# Patient Record
Sex: FEMALE | Race: BLACK OR AFRICAN AMERICAN | URBAN - METROPOLITAN AREA
[De-identification: names, ages, dates, MRNs, and addresses within clinical notes are randomized per-mention and may not be internally consistent; named-entity substitution may affect disease eponyms.]

---

## 2018-04-18 ENCOUNTER — CONSULT (OUTPATIENT)
Dept: URBAN - METROPOLITAN AREA CLINIC 26 | Facility: CLINIC | Age: 63
End: 2018-04-18
Payer: MEDICARE

## 2018-04-18 DIAGNOSIS — H40.013 OPEN ANGLE WITH BORDERLINE FINDINGS, LOW RISK, BILATERAL: Primary | ICD-10-CM

## 2018-04-18 PROCEDURE — 92133 CPTRZD OPH DX IMG PST SGM ON: CPT | Performed by: OPHTHALMOLOGY

## 2018-04-18 PROCEDURE — 92250 FUNDUS PHOTOGRAPHY W/I&R: CPT | Performed by: OPHTHALMOLOGY

## 2018-04-18 PROCEDURE — 76514 ECHO EXAM OF EYE THICKNESS: CPT | Performed by: OPHTHALMOLOGY

## 2018-04-18 PROCEDURE — 92083 EXTENDED VISUAL FIELD XM: CPT | Performed by: OPHTHALMOLOGY

## 2018-04-18 PROCEDURE — 92020 GONIOSCOPY: CPT | Performed by: OPHTHALMOLOGY

## 2018-04-18 PROCEDURE — 92004 COMPRE OPH EXAM NEW PT 1/>: CPT | Performed by: OPHTHALMOLOGY

## 2018-04-18 ASSESSMENT — INTRAOCULAR PRESSURE
OS: 16
OD: 21

## 2021-12-01 ENCOUNTER — OFFICE VISIT (OUTPATIENT)
Dept: URBAN - METROPOLITAN AREA CLINIC 21 | Facility: CLINIC | Age: 66
End: 2021-12-01
Payer: MEDICARE

## 2021-12-01 DIAGNOSIS — H25.12 AGE-RELATED NUCLEAR CATARACT, LEFT EYE: ICD-10-CM

## 2021-12-01 DIAGNOSIS — H04.123 TEAR FILM INSUFFICIENCY OF BILATERAL LACRIMAL GLANDS: ICD-10-CM

## 2021-12-01 PROCEDURE — 99213 OFFICE O/P EST LOW 20 MIN: CPT | Performed by: OPTOMETRIST

## 2021-12-01 PROCEDURE — 99203 OFFICE O/P NEW LOW 30 MIN: CPT | Performed by: OPTOMETRIST

## 2021-12-01 ASSESSMENT — INTRAOCULAR PRESSURE
OD: 13
OD: 14
OS: 12
OS: 14

## 2021-12-01 NOTE — IMPRESSION/PLAN
Impression: Open angle with borderline findings, low risk, bilateral: H40.013. Plan: Recommend continue drops as prescribed follow up in 4 months f/u.

## 2021-12-01 NOTE — IMPRESSION/PLAN
Impression: Age-related nuclear cataract, left eye: H25.12. Plan: Advised patient of condition. Mild, no surgical intervention required.

## 2022-01-18 ENCOUNTER — OFFICE VISIT (OUTPATIENT)
Dept: URBAN - METROPOLITAN AREA CLINIC 24 | Facility: CLINIC | Age: 67
End: 2022-01-18
Payer: MEDICARE

## 2022-01-18 PROCEDURE — 92083 EXTENDED VISUAL FIELD XM: CPT | Performed by: OPHTHALMOLOGY

## 2022-01-18 PROCEDURE — 99204 OFFICE O/P NEW MOD 45 MIN: CPT | Performed by: OPHTHALMOLOGY

## 2022-01-18 PROCEDURE — 92020 GONIOSCOPY: CPT | Performed by: OPHTHALMOLOGY

## 2022-01-18 ASSESSMENT — INTRAOCULAR PRESSURE
OS: 14
OD: 15

## 2022-01-18 NOTE — IMPRESSION/PLAN
Impression: Open angle with borderline findings, low risk, bilateral Plan: PT HAS POAG OU           GONIO GRADE:  OD=SS OS=SLIT SS (1/18/22)                PACHS :[phone] PT IS UNDER THE CARE OF DR Meir Park, WAS LAST SEEN AT [facility] ON 4/18/18 TODAY IS 1/18/22 PT REPORTS POSITIVE  FAM HX OF GLAUCOMA (MOTHER WENT BLIND , SISTER AND 2 BROTHERS) PT DENIES SULFA ALLERGY
PT DENIES LUNG DZ
TARGET IOP LOW TEENS OR LESS
RECOMMEND 1. CONTINUE LATANOPROST OU QPM ( PRESENTED USING ) 2. CONTINUE DORZOLAMIDE OD BID  (I SUGGEST ADDING TO OS BID ON 1/18/22) 3. PT REPORTS HAVING USED COMBIGAN IN THE PAST AND WAS TAKEN OFF FOR REASONS WHICH PT IS UNAWARE OF
4. I CONTINUE TO RECOMMEND PT RETURN TO CARE OF DR Meir Park TO EVALUATE THE TREATMENT OPTIONS WHICH HE FEELS IS/ARE MOST PRUDENT. I AGREE WITH THE PATIENT THAT IOP REDUCTION TO THE MID TO LOW TEENS OR LESS IS IDEAL 5.  THE PT WILL SEE DR SURESH AS WELL TO HELP IDENTIFY THE CONTRIBUTING FACTORS TO HER BLURRY VISION

## 2022-04-12 ENCOUNTER — OFFICE VISIT (OUTPATIENT)
Dept: URBAN - METROPOLITAN AREA CLINIC 24 | Facility: CLINIC | Age: 67
End: 2022-04-12
Payer: MEDICARE

## 2022-04-12 DIAGNOSIS — H40.1134 PRIMARY OPEN-ANGLE GLAUCOMA, INDETERMINATE, BILATERAL: ICD-10-CM

## 2022-04-12 DIAGNOSIS — H35.341 MACULAR CYST, HOLE, OR PSEUDOHOLE, RIGHT EYE: ICD-10-CM

## 2022-04-12 DIAGNOSIS — H25.812 COMBINED FORMS OF AGE-RELATED CATARACT, LEFT EYE: Primary | ICD-10-CM

## 2022-04-12 DIAGNOSIS — Z96.1 PRESENCE OF INTRAOCULAR LENS: ICD-10-CM

## 2022-04-12 PROCEDURE — 99214 OFFICE O/P EST MOD 30 MIN: CPT | Performed by: OPTOMETRIST

## 2022-04-12 PROCEDURE — 92134 CPTRZ OPH DX IMG PST SGM RTA: CPT | Performed by: OPTOMETRIST

## 2022-04-12 RX ORDER — BIMATOPROST 0.1 MG/ML
0.01 % SOLUTION/ DROPS OPHTHALMIC
Qty: 7.5 | Refills: 3 | Status: ACTIVE
Start: 2022-04-12

## 2022-04-12 RX ORDER — DORZOLAMIDE HYDROCHLORIDE AND TIMOLOL MALEATE 20; 5 MG/ML; MG/ML
SOLUTION/ DROPS OPHTHALMIC
Qty: 15 | Refills: 3 | Status: ACTIVE
Start: 2022-04-12

## 2022-04-12 RX ORDER — LATANOPROST 50 UG/ML
0.005 % SOLUTION OPHTHALMIC
Qty: 7.5 | Refills: 3 | Status: ACTIVE
Start: 2022-04-12

## 2022-04-12 ASSESSMENT — VISUAL ACUITY
OD: 20/25
OS: 20/20

## 2022-04-12 ASSESSMENT — INTRAOCULAR PRESSURE
OD: 18
OS: 13

## 2022-04-12 ASSESSMENT — KERATOMETRY
OS: 42.30
OD: 42.88

## 2022-04-12 NOTE — IMPRESSION/PLAN
Impression: Macular cyst, hole, or pseudohole, right eye: H35.341.
- s/p repair Mar '11, Dr. Richard Villarreal Plan: Good surgical repair; however limits. Pt aware. NO further action indicated.

## 2022-04-12 NOTE — IMPRESSION/PLAN
Impression: Primary open-angle glaucoma, indeterminate, bilateral: H40.1134.  Plan: Continue gtts / strict ongoing care w/ Dr. Jt Hernandez as directed

## 2022-04-12 NOTE — IMPRESSION/PLAN
Impression: Combined forms of age-related cataract, left eye: H25.812. Plan: Discussed cataracts with patient. Discussed treatment options. Surgical treatment is recommended. Surgical risks/ benefits discussed. Patient will consider surgery. Recommend proceed w/ ready.

## 2022-07-05 ENCOUNTER — OFFICE VISIT (OUTPATIENT)
Dept: URBAN - METROPOLITAN AREA CLINIC 24 | Facility: CLINIC | Age: 67
End: 2022-07-05
Payer: MEDICARE

## 2022-07-05 DIAGNOSIS — H40.013 OPEN ANGLE WITH BORDERLINE FINDINGS, LOW RISK, BILATERAL: Primary | ICD-10-CM

## 2022-07-05 PROCEDURE — 99213 OFFICE O/P EST LOW 20 MIN: CPT | Performed by: OPHTHALMOLOGY

## 2022-07-05 ASSESSMENT — INTRAOCULAR PRESSURE
OD: 14
OS: 14

## 2022-07-05 NOTE — IMPRESSION/PLAN
Impression: Open angle with borderline findings, low risk, bilateral Plan: PT HAS POAG OU           GONIO GRADE:  OD=SS OS=SLIT SS (1/18/22)                PACHS :[phone] PT IS UNDER THE CARE OF DR Feliciano Riddle, WAS LAST SEEN AT [facility] ON 4/18/18 TODAY IS 1/18/22 PT REPORTS POSITIVE  FAM HX OF GLAUCOMA (MOTHER WENT BLIND , SISTER AND 2 BROTHERS) PT DENIES SULFA ALLERGY
PT DENIES LUNG DZ
TARGET IOP LOW TEENS OR LESS
RECOMMEND 1. CONTINUE LATANOPROST OU QPM ( PRESENTED USING ) 2. CONTINUE DORZOLAMIDE OU BID  (I SUGGESTED ADDING TO OS BID ON 1/18/22) 3. PT REPORTS HAVING USED COMBIGAN IN THE PAST AND WAS TAKEN OFF FOR REASONS WHICH PT IS UNAWARE OF
4.  F/U 6 MONTHS WITH VF

## 2022-10-14 ENCOUNTER — OFFICE VISIT (OUTPATIENT)
Dept: URBAN - METROPOLITAN AREA CLINIC 21 | Facility: CLINIC | Age: 67
End: 2022-10-14
Payer: MEDICARE

## 2022-10-14 DIAGNOSIS — H25.12 AGE-RELATED NUCLEAR CATARACT, LEFT EYE: ICD-10-CM

## 2022-10-14 DIAGNOSIS — H40.1132 PRIMARY OPEN-ANGLE GLAUCOMA, MODERATE STAGE, BILATERAL: Primary | ICD-10-CM

## 2022-10-14 DIAGNOSIS — Z96.1 PRESENCE OF INTRAOCULAR LENS: ICD-10-CM

## 2022-10-14 PROCEDURE — 99213 OFFICE O/P EST LOW 20 MIN: CPT | Performed by: OPTOMETRIST

## 2022-10-14 PROCEDURE — 92133 CPTRZD OPH DX IMG PST SGM ON: CPT | Performed by: OPTOMETRIST

## 2022-10-14 RX ORDER — LATANOPROST 50 UG/ML
0.005 % SOLUTION OPHTHALMIC
Qty: 7.5 | Refills: 3 | Status: ACTIVE
Start: 2022-10-14

## 2022-10-14 RX ORDER — DORZOLAMIDE HYDROCHLORIDE AND TIMOLOL MALEATE 20; 5 MG/ML; MG/ML
SOLUTION/ DROPS OPHTHALMIC
Qty: 15 | Refills: 3 | Status: ACTIVE
Start: 2022-10-14

## 2022-10-14 ASSESSMENT — INTRAOCULAR PRESSURE
OD: 15
OS: 14

## 2022-10-14 NOTE — IMPRESSION/PLAN
Impression: Primary open-angle glaucoma, moderate stage, bilateral: H40.4442. Plan: Discussed stable glaucoma findings of today's exam with patient, including diagnosis in detail. Patient will return for testing to measure changes in Glaucoma. Recommend VF, OCT, next available. Patient aware this appointment will take longer than the average appointment. Call with questions, changes in vision or new symptoms. 
RTC: 4 months with Dr Randell Pickett

## 2023-02-23 ENCOUNTER — OFFICE VISIT (OUTPATIENT)
Dept: URBAN - METROPOLITAN AREA CLINIC 21 | Facility: CLINIC | Age: 68
End: 2023-02-23
Payer: MEDICARE

## 2023-02-23 DIAGNOSIS — H40.1132 PRIMARY OPEN-ANGLE GLAUCOMA, MODERATE STAGE, BILATERAL: Primary | ICD-10-CM

## 2023-02-23 DIAGNOSIS — H35.341 MACULAR CYST, HOLE, OR PSEUDOHOLE, RIGHT EYE: ICD-10-CM

## 2023-02-23 DIAGNOSIS — H25.12 AGE-RELATED NUCLEAR CATARACT, LEFT EYE: ICD-10-CM

## 2023-02-23 PROCEDURE — 99213 OFFICE O/P EST LOW 20 MIN: CPT | Performed by: OPHTHALMOLOGY

## 2023-02-23 RX ORDER — LATANOPROST 50 UG/ML
0.005 % SOLUTION OPHTHALMIC
Qty: 7.5 | Refills: 3 | Status: ACTIVE
Start: 2023-02-23

## 2023-02-23 RX ORDER — DORZOLAMIDE HYDROCHLORIDE AND TIMOLOL MALEATE 20; 5 MG/ML; MG/ML
SOLUTION/ DROPS OPHTHALMIC
Qty: 15 | Refills: 3 | Status: ACTIVE
Start: 2023-02-23

## 2023-02-23 ASSESSMENT — INTRAOCULAR PRESSURE
OS: 15
OD: 14

## 2023-02-23 NOTE — IMPRESSION/PLAN
Impression: Macular cyst, hole, or pseudohole, right eye: H35.341. Hx of , 2011 Plan: Advised patient of condition. Stable. Continue to monitor annually.

## 2023-02-23 NOTE — IMPRESSION/PLAN
Impression: Primary open-angle glaucoma, moderate stage, bilateral: H40.1132. Plan: Recommend continue drops as prescribed, Cosopt and Latanoprost, OU. Pt to scheduled VF next available  and follow up in 6 months.

## 2023-02-23 NOTE — IMPRESSION/PLAN
Impression: Age-related nuclear cataract, left eye: H25.12. Plan: Discussed cataract diagnosis with the patient. No treatment required at this time. Patient to monitor visual changes.

## 2023-09-19 ENCOUNTER — OFFICE VISIT (OUTPATIENT)
Dept: URBAN - METROPOLITAN AREA CLINIC 21 | Facility: CLINIC | Age: 68
End: 2023-09-19
Payer: MEDICARE

## 2023-09-19 DIAGNOSIS — H35.371 PUCKERING OF MACULA, RIGHT EYE: ICD-10-CM

## 2023-09-19 DIAGNOSIS — H25.12 AGE-RELATED NUCLEAR CATARACT, LEFT EYE: ICD-10-CM

## 2023-09-19 DIAGNOSIS — H40.1132 PRIMARY OPEN-ANGLE GLAUCOMA, MODERATE STAGE, BILATERAL: Primary | ICD-10-CM

## 2023-09-19 DIAGNOSIS — H35.341 MACULAR CYST, HOLE, OR PSEUDOHOLE, RIGHT EYE: ICD-10-CM

## 2023-09-19 DIAGNOSIS — Z96.1 PRESENCE OF INTRAOCULAR LENS: ICD-10-CM

## 2023-09-19 PROCEDURE — 99214 OFFICE O/P EST MOD 30 MIN: CPT

## 2023-09-19 RX ORDER — DORZOLAMIDE HYDROCHLORIDE AND TIMOLOL MALEATE 20; 5 MG/ML; MG/ML
SOLUTION/ DROPS OPHTHALMIC
Qty: 15 | Refills: 3 | Status: ACTIVE
Start: 2023-09-19

## 2023-09-19 RX ORDER — LATANOPROST 50 UG/ML
0.005 % SOLUTION OPHTHALMIC
Qty: 7.5 | Refills: 3 | Status: ACTIVE
Start: 2023-09-19

## 2023-09-19 ASSESSMENT — INTRAOCULAR PRESSURE
OD: 15
OS: 15

## 2024-01-22 ENCOUNTER — TECH ONLY (OUTPATIENT)
Dept: URBAN - METROPOLITAN AREA CLINIC 21 | Facility: CLINIC | Age: 69
End: 2024-01-22
Payer: MEDICARE

## 2024-01-22 DIAGNOSIS — H40.1132 PRIMARY OPEN-ANGLE GLAUCOMA, BILATERAL, MODERATE STAGE: Primary | ICD-10-CM

## 2024-02-01 ENCOUNTER — OFFICE VISIT (OUTPATIENT)
Dept: URBAN - METROPOLITAN AREA CLINIC 21 | Facility: CLINIC | Age: 69
End: 2024-02-01
Payer: MEDICARE

## 2024-02-01 PROCEDURE — 99212 OFFICE O/P EST SF 10 MIN: CPT

## 2024-02-01 ASSESSMENT — INTRAOCULAR PRESSURE
OS: 15
OD: 15

## 2024-06-05 ENCOUNTER — OFFICE VISIT (OUTPATIENT)
Dept: URBAN - METROPOLITAN AREA CLINIC 21 | Facility: CLINIC | Age: 69
End: 2024-06-05
Payer: MEDICARE

## 2024-06-05 DIAGNOSIS — H40.1132 PRIMARY OPEN-ANGLE GLAUCOMA, MODERATE STAGE, BILATERAL: Primary | ICD-10-CM

## 2024-06-05 PROCEDURE — 99213 OFFICE O/P EST LOW 20 MIN: CPT

## 2024-06-05 RX ORDER — DORZOLAMIDE HYDROCHLORIDE AND TIMOLOL MALEATE 20; 5 MG/ML; MG/ML
SOLUTION/ DROPS OPHTHALMIC
Qty: 15 | Refills: 3 | Status: ACTIVE
Start: 2024-06-05

## 2024-06-05 RX ORDER — LATANOPROST 50 UG/ML
0.005 % SOLUTION OPHTHALMIC
Qty: 7.5 | Refills: 3 | Status: ACTIVE
Start: 2024-06-05

## 2024-06-05 ASSESSMENT — INTRAOCULAR PRESSURE
OS: 14
OD: 16
OD: 17
OS: 17

## 2024-07-11 ENCOUNTER — OFFICE VISIT (OUTPATIENT)
Dept: URBAN - METROPOLITAN AREA CLINIC 21 | Facility: CLINIC | Age: 69
End: 2024-07-11
Payer: MEDICARE

## 2024-07-11 DIAGNOSIS — H40.1132 PRIMARY OPEN-ANGLE GLAUCOMA, MODERATE STAGE, BILATERAL: Primary | ICD-10-CM

## 2024-07-11 DIAGNOSIS — H04.123 TEAR FILM INSUFFICIENCY OF BILATERAL LACRIMAL GLANDS: ICD-10-CM

## 2024-07-11 PROCEDURE — 99213 OFFICE O/P EST LOW 20 MIN: CPT

## 2024-07-11 ASSESSMENT — INTRAOCULAR PRESSURE
OD: 16
OS: 16
OD: 19
OS: 15

## 2024-09-12 ENCOUNTER — OFFICE VISIT (OUTPATIENT)
Dept: URBAN - METROPOLITAN AREA CLINIC 21 | Facility: CLINIC | Age: 69
End: 2024-09-12
Payer: MEDICARE

## 2024-09-12 DIAGNOSIS — H04.123 TEAR FILM INSUFFICIENCY OF BILATERAL LACRIMAL GLANDS: ICD-10-CM

## 2024-09-12 DIAGNOSIS — H25.812 COMBINED FORMS OF AGE-RELATED CATARACT, LEFT EYE: ICD-10-CM

## 2024-09-12 DIAGNOSIS — H40.1132 PRIMARY OPEN-ANGLE GLAUCOMA, MODERATE STAGE, BILATERAL: Primary | ICD-10-CM

## 2024-09-12 DIAGNOSIS — Z96.1 PRESENCE OF INTRAOCULAR LENS: ICD-10-CM

## 2024-09-12 PROCEDURE — 99214 OFFICE O/P EST MOD 30 MIN: CPT

## 2024-09-12 PROCEDURE — 92133 CPTRZD OPH DX IMG PST SGM ON: CPT

## 2024-09-12 ASSESSMENT — INTRAOCULAR PRESSURE
OD: 14
OS: 14

## 2024-09-17 ENCOUNTER — TECH ONLY (OUTPATIENT)
Dept: URBAN - METROPOLITAN AREA CLINIC 21 | Facility: CLINIC | Age: 69
End: 2024-09-17
Payer: MEDICARE

## 2024-09-17 DIAGNOSIS — H25.812 COMBINED FORMS OF AGE-RELATED CATARACT, LEFT EYE: Primary | ICD-10-CM

## 2024-09-17 DIAGNOSIS — H40.1132 PRIMARY OPEN-ANGLE GLAUCOMA, BILATERAL, MODERATE STAGE: ICD-10-CM

## 2024-12-11 ENCOUNTER — OFFICE VISIT (OUTPATIENT)
Dept: URBAN - METROPOLITAN AREA CLINIC 21 | Facility: CLINIC | Age: 69
End: 2024-12-11
Payer: MEDICARE

## 2024-12-11 DIAGNOSIS — H40.1132 PRIMARY OPEN-ANGLE GLAUCOMA, MODERATE STAGE, BILATERAL: Primary | ICD-10-CM

## 2024-12-11 DIAGNOSIS — H25.812 COMBINED FORMS OF AGE-RELATED CATARACT, LEFT EYE: ICD-10-CM

## 2024-12-11 DIAGNOSIS — H35.371 PUCKERING OF MACULA, RIGHT EYE: ICD-10-CM

## 2024-12-11 DIAGNOSIS — H04.123 TEAR FILM INSUFFICIENCY OF BILATERAL LACRIMAL GLANDS: ICD-10-CM

## 2024-12-11 PROCEDURE — 99213 OFFICE O/P EST LOW 20 MIN: CPT

## 2024-12-11 RX ORDER — LATANOPROST 50 UG/ML
0.005 % SOLUTION OPHTHALMIC
Qty: 7.5 | Refills: 3 | Status: ACTIVE
Start: 2024-12-11

## 2024-12-11 RX ORDER — DORZOLAMIDE HYDROCHLORIDE AND TIMOLOL MALEATE 20; 5 MG/ML; MG/ML
SOLUTION/ DROPS OPHTHALMIC
Qty: 15 | Refills: 3 | Status: ACTIVE
Start: 2024-12-11

## 2024-12-11 ASSESSMENT — INTRAOCULAR PRESSURE
OD: 15
OS: 14

## 2025-04-10 ENCOUNTER — OFFICE VISIT (OUTPATIENT)
Dept: URBAN - METROPOLITAN AREA CLINIC 21 | Facility: CLINIC | Age: 70
End: 2025-04-10
Payer: MEDICARE

## 2025-04-10 DIAGNOSIS — H25.812 COMBINED FORMS OF AGE-RELATED CATARACT, LEFT EYE: ICD-10-CM

## 2025-04-10 DIAGNOSIS — H40.1132 PRIMARY OPEN-ANGLE GLAUCOMA, MODERATE STAGE, BILATERAL: Primary | ICD-10-CM

## 2025-04-10 DIAGNOSIS — H04.123 TEAR FILM INSUFFICIENCY OF BILATERAL LACRIMAL GLANDS: ICD-10-CM

## 2025-04-10 PROCEDURE — 99212 OFFICE O/P EST SF 10 MIN: CPT

## 2025-04-10 RX ORDER — DORZOLAMIDE HYDROCHLORIDE AND TIMOLOL MALEATE 20; 5 MG/ML; MG/ML
SOLUTION/ DROPS OPHTHALMIC
Qty: 15 | Refills: 3 | Status: ACTIVE
Start: 2025-04-10

## 2025-04-10 ASSESSMENT — INTRAOCULAR PRESSURE
OS: 14
OD: 16

## 2025-07-10 ENCOUNTER — OFFICE VISIT (OUTPATIENT)
Dept: URBAN - METROPOLITAN AREA CLINIC 21 | Facility: CLINIC | Age: 70
End: 2025-07-10
Payer: MEDICARE

## 2025-07-10 DIAGNOSIS — H35.371 PUCKERING OF MACULA, RIGHT EYE: ICD-10-CM

## 2025-07-10 DIAGNOSIS — H40.1132 PRIMARY OPEN-ANGLE GLAUCOMA, MODERATE STAGE, BILATERAL: Primary | ICD-10-CM

## 2025-07-10 DIAGNOSIS — H25.812 COMBINED FORMS OF AGE-RELATED CATARACT, LEFT EYE: ICD-10-CM

## 2025-07-10 PROCEDURE — 99213 OFFICE O/P EST LOW 20 MIN: CPT

## 2025-07-10 PROCEDURE — 92133 CPTRZD OPH DX IMG PST SGM ON: CPT

## 2025-07-10 ASSESSMENT — INTRAOCULAR PRESSURE
OS: 16
OD: 16

## 2025-07-10 ASSESSMENT — KERATOMETRY
OS: 42.38
OD: 43.38